# Patient Record
Sex: FEMALE | Race: WHITE | Employment: OTHER | URBAN - METROPOLITAN AREA
[De-identification: names, ages, dates, MRNs, and addresses within clinical notes are randomized per-mention and may not be internally consistent; named-entity substitution may affect disease eponyms.]

---

## 2017-06-13 ENCOUNTER — HOSPITAL ENCOUNTER (EMERGENCY)
Facility: HOSPITAL | Age: 77
Discharge: HOME | End: 2017-06-13
Attending: EMERGENCY MEDICINE
Payer: MEDICARE

## 2017-06-13 ENCOUNTER — APPOINTMENT (OUTPATIENT)
Dept: GENERAL RADIOLOGY | Facility: HOSPITAL | Age: 77
End: 2017-06-13
Attending: EMERGENCY MEDICINE
Payer: MEDICARE

## 2017-06-13 ENCOUNTER — HOSPITAL ENCOUNTER (OUTPATIENT)
Age: 77
Discharge: ANOTHER HOSPITAL | End: 2017-06-13
Attending: FAMILY MEDICINE
Payer: MEDICARE

## 2017-06-13 VITALS
TEMPERATURE: 97 F | WEIGHT: 205 LBS | HEART RATE: 62 BPM | SYSTOLIC BLOOD PRESSURE: 140 MMHG | DIASTOLIC BLOOD PRESSURE: 89 MMHG | RESPIRATION RATE: 20 BRPM | OXYGEN SATURATION: 99 %

## 2017-06-13 VITALS
HEART RATE: 62 BPM | HEIGHT: 64 IN | TEMPERATURE: 98 F | OXYGEN SATURATION: 98 % | DIASTOLIC BLOOD PRESSURE: 62 MMHG | BODY MASS INDEX: 35 KG/M2 | WEIGHT: 205 LBS | SYSTOLIC BLOOD PRESSURE: 139 MMHG | RESPIRATION RATE: 18 BRPM

## 2017-06-13 DIAGNOSIS — N30.00 ACUTE CYSTITIS WITHOUT HEMATURIA: ICD-10-CM

## 2017-06-13 DIAGNOSIS — R55 SYNCOPE AND COLLAPSE: Primary | ICD-10-CM

## 2017-06-13 DIAGNOSIS — E86.0 DEHYDRATION: ICD-10-CM

## 2017-06-13 PROCEDURE — 93010 ELECTROCARDIOGRAM REPORT: CPT

## 2017-06-13 PROCEDURE — 80053 COMPREHEN METABOLIC PANEL: CPT

## 2017-06-13 PROCEDURE — 99205 OFFICE O/P NEW HI 60 MIN: CPT

## 2017-06-13 PROCEDURE — 96360 HYDRATION IV INFUSION INIT: CPT

## 2017-06-13 PROCEDURE — 99285 EMERGENCY DEPT VISIT HI MDM: CPT

## 2017-06-13 PROCEDURE — 96361 HYDRATE IV INFUSION ADD-ON: CPT

## 2017-06-13 PROCEDURE — 93005 ELECTROCARDIOGRAM TRACING: CPT

## 2017-06-13 PROCEDURE — 81001 URINALYSIS AUTO W/SCOPE: CPT | Performed by: EMERGENCY MEDICINE

## 2017-06-13 PROCEDURE — 82962 GLUCOSE BLOOD TEST: CPT

## 2017-06-13 PROCEDURE — 71010 XR CHEST AP PORTABLE  (CPT=71010): CPT | Performed by: EMERGENCY MEDICINE

## 2017-06-13 PROCEDURE — 87086 URINE CULTURE/COLONY COUNT: CPT | Performed by: EMERGENCY MEDICINE

## 2017-06-13 PROCEDURE — 85025 COMPLETE CBC W/AUTO DIFF WBC: CPT

## 2017-06-13 RX ORDER — ATENOLOL 50 MG/1
50 TABLET ORAL DAILY
COMMUNITY

## 2017-06-13 RX ORDER — SULFAMETHOXAZOLE AND TRIMETHOPRIM 800; 160 MG/1; MG/1
1 TABLET ORAL 2 TIMES DAILY
Qty: 20 TABLET | Refills: 0 | Status: SHIPPED | OUTPATIENT
Start: 2017-06-13 | End: 2017-06-23

## 2017-06-13 RX ORDER — LISINOPRIL AND HYDROCHLOROTHIAZIDE 25; 20 MG/1; MG/1
1 TABLET ORAL 2 TIMES DAILY
COMMUNITY

## 2017-06-13 RX ORDER — ATORVASTATIN CALCIUM 10 MG/1
10 TABLET, FILM COATED ORAL NIGHTLY
COMMUNITY

## 2017-06-13 NOTE — ED PROVIDER NOTES
Patient Seen in: [facility]    History   Patient presents with:  Syncope (cardiovascular, neurologic)    Stated Complaint: PASSED OUT FOR ABOUT 1 MIN/POSS DEHYDRATED    HPI    82-year-old female presents for syncope.   Patient st 06/13/17 1322 Temporal   SpO2 06/13/17 1322 98 %   O2 Device 06/13/17 1322 None (Room air)       Current:/89 mmHg  Pulse 62  Temp(Src) 97.1 °F (36.2 °C) (Temporal)  Resp 20  Wt 92.987 kg  SpO2 99%        Physical Exam   Constitutional: She is oriente Disposition and Plan     Clinical Impression:  Syncope and collapse  (primary encounter diagnosis)    Disposition:   Ic to ed    Follow-up:  [address]  [phone]  Today  For re-check      Me

## 2017-06-13 NOTE — ED NOTES
Pt was outside picking tuul with her family, states when she came inside she became light headed and did have a syncopal episode. Pt states she should not have been outside in the heat and humidity.
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
## 2017-06-13 NOTE — ED NOTES
Pt ambulated with upright steady gait. Pt tolerating oral fluids without complication. Family at bedside. No needs at this time.

## 2017-06-13 NOTE — ED PROVIDER NOTES
Patient Seen in: [facility] Emergency Department    History   Patient presents with:  Syncope (cardiovascular, neurologic)    Stated Complaint: syncope, seen at urgent care and sent here. HPI    Patient presents after a syncopal episode.   The p date: 06/13/1971      Review of Systems    Positive for stated complaint: syncope, seen at urgent care and sent here. Other systems are as noted in HPI. Constitutional and vital signs reviewed.       All other systems reviewed and negative except as not Notable for the following:     Neutrophil Absolute Prelim 8.80 (*)     Neutrophil Absolute 8.80 (*)     All other components within normal limits   CBC WITH DIFFERENTIAL WITH PLATELET    Narrative:      The following orders were created for panel order CBC the heat. Her workup here has been reassuring and she feels better after IV and oral fluids. She does have some evidence of urinary tract infection and will be started on antibiotics. She would like to go home and I think this is reasonable.   She will r

## 2017-06-13 NOTE — ED INITIAL ASSESSMENT (HPI)
Pt reports syncopal episode this morning. Pt states she was picking cherries outside and passed out. Denies injury. Was seen at ER in [place] and told to come to E[facility] for follow up. Hx of HTN. Is diabetic.

## (undated) NOTE — ED AVS SNAPSHOT
[facility] Emergency Department    [facility]  [address] [address]    Phone:  [phone]    Fax:  [phone]           Delia Berry   MRN: [id number]    Department:  [facility] Emergency Department   Date of Visit:  6 Si usted tiene algun problema con reid sequimiento, por favor llame a nuestro adminstrador de kelli al [phone]    Expect to receive an electronic request (by e-mail or text) to complete a self-assessment the day after your visit.   You may also receiv Evelyn Drummond [address] ([facility] [place]) [address] ([facility]) [address]  ([address]) [address] [facility] syncopal episode this morning. Pt states she was picking  cherries outside and passed out. Denies injury. Was seen at ER in  [place] and told to come to EED for follow up. Hx of   HTN. Is diabetic.           FINDINGS:  Cardiac silhouette and pulmonary v

## (undated) NOTE — ED AVS SNAPSHOT
[facility] in [address] [place] [address] Drive,[address] Floor    [address] [place] [address]    Phone:  [phone]    Fax:  [phone]           Shayyjosé antonio Amanda   MRN: [id number]    Department:  [facility] in [facility] END   Date of Visit:  6/13/2017 from our patient liason soon after your visit. Also, some patients receive a detailed feedback survey mailed to them a week after the visit. If you receive this, we would really appreciate it if you could take the time to complete it. Thank you!       You [facility] [address] ([address] [facility] [id number] [address] ([address]) [address] [facility]. ([address]) [facility] Support Staff. Remember, MyChart is NOT to be used for urgent needs. For medical emergencies, dial 911.

## (undated) NOTE — ED AVS SNAPSHOT
[facility] Emergency Department    [place]  [address] [address]    Phone:  [phone]    Fax:  [phone]           Lisa Lim   MRN: [id number]    Department:  [facility] Emergency Department   Date of Visit:  6 IF THERE IS ANY CHANGE OR WORSENING OF YOUR CONDITION, CALL YOUR PRIMARY CARE PHYSICIAN AT ONCE OR RETURN IMMEDIATELY TO THE EMERGENCY DEPARTMENT.     If you have been prescribed any medication(s), please fill your prescription right away and begin taking t